# Patient Record
Sex: MALE | Race: WHITE
[De-identification: names, ages, dates, MRNs, and addresses within clinical notes are randomized per-mention and may not be internally consistent; named-entity substitution may affect disease eponyms.]

---

## 2019-03-19 ENCOUNTER — HOSPITAL ENCOUNTER (INPATIENT)
Dept: HOSPITAL 80 - FED | Age: 29
LOS: 3 days | Discharge: HOME | DRG: 885 | End: 2019-03-22
Attending: PSYCHIATRY & NEUROLOGY | Admitting: PSYCHIATRY & NEUROLOGY
Payer: COMMERCIAL

## 2019-03-19 DIAGNOSIS — F33.2: Primary | ICD-10-CM

## 2019-03-19 DIAGNOSIS — R45.851: ICD-10-CM

## 2019-03-19 LAB — PLATELET # BLD: 205 10^3/UL (ref 150–400)

## 2019-03-19 PROCEDURE — G0480 DRUG TEST DEF 1-7 CLASSES: HCPCS

## 2019-03-19 NOTE — EDPHY
H & P


Time Seen by Provider: 03/19/19 23:03


HPI/ROS: 





Chief Complaint:  Suicidal ideation, overdose





HPI:  28-year-old male with a history of depression and feelings of 

hopelessness climbed [place] this afternoon and took 20, 10 mg Vicodin 

tablets that he had taken from his grandfather.  Patient states he took these 

at approximately 4:00 in the afternoon.  This was an attempted suicide.  Denies 

any other ingestions.  He does states that he did count tablets.  EMS was 

called and patient was brought down by [email or web address] rescue.  Patient has been 

placed on a mental health hold by the [facility]'s department.  EMS did give a 

0.25 of Narcan due to the patient's somnolence and he has been awake, alert and 

answering questions.  Denies any other ingestions.  Has a history of 

depression.  He does take citalopram.  Does not have a counselor or 

psychiatrist.  States that he took these because he is "in a lot of pain" and 

does not want to be a burden to his family.





ROS:  10 systems were reviewed and were negative except those elements noted in 

the HPI.





PMH:  Denies





Social History: No smoking, occasional alcohol,  no recreational drug use





Family History: non-contributory





Physical Exam:


Gen: Awake, Alert, blunted affect


HEENT:  


     Nose: no rhinorrhea


     Eyes: PERRLA, EOMI


     Mouth: Moist mucosa 


Neck: Supple, no JVD


Chest: nontender, lungs clear to auscultation


Heart: S1, S2 normal, no murmur


Abd: Soft, non-tender, no guarding


Back: no CVA tenderness, no midline tenderness 


Ext: no edema, non-tender


Skin: no rash


Neuro: CN II-XII intact, Sensation grossly intact, Strength 5/5 in bilateral 

upper and lower extremities


 (Koko Burris)


Constitutional: 


 Initial Vital Signs











Temperature (C)  36.5 C   03/19/19 23:01


 


Heart Rate  82   03/19/19 23:01


 


Respiratory Rate  17   03/19/19 23:01


 


Blood Pressure  155/112 H  03/19/19 23:01


 


O2 Sat (%)  97   03/19/19 23:01








 











O2 Delivery Mode               Room Air














Allergies/Adverse Reactions: 


 





No Known Allergies Allergy (Verified 03/20/19 08:23)


 








Home Medications: 














 Medication  Instructions  Recorded


 


Citalopram [CeleXA] 20 mg PO DAILY 03/19/19


 


Ascorbic Acid [Vitamin C 500 mg 500 mg PO DAILY 03/20/19





(*)]  


 


Glucosamine Sulfate [Glucosamine 500 mg PO DAILY 03/20/19





Sulfate 500 MG (*)]  


 


Multivitamins [Multivitamin (*)] 1 each PO DAILY 03/20/19


 


Omega-3 Fatty Acids [Fish Oil 1000 1,000 mg PO DAILY 03/20/19





mg (*)]  


 


Vitamin B Complex [Vitamin B 1 each PO DAILY 03/20/19





Complex (OTC)]  














Medical Decision Making


ED Course/Re-evaluation: 





28-year-old suicidal male with a Vicodin ingestion 7 hr prior to arrival.  

Patient is very coughing about this time of ingestion.  His 7 hr level is 40.  

The cut off on the Carissa Medrano nomogram at hours is 90.  He is well within 

the safe limits.  





A repeat Tylenol level less than 10. No evidence of toxicity.  Patient is 

medically cleared for mental health evaluation.  





0700  patient signed out to Dr. Rocha pending mental health evaluation.  No 

issues during my care of this patient overnight. (Koko Burris)


Other Provider: 





Care assumed at 0642 ; evaluated by Dr. Burris last night for overdose and per 

him deemed clinically stable for psychiatric evaluation and treatment, no 

further diagnostics or therapeutics.  Plan this morning for mental health 

evaluation, on a mental health hold.





800:  The patient will be transferred to [facility] for 

inpatient psychiatric hospital bed not available at this facility, in stable 

condition; accepting physician is Dr. Mateo Faulkner.  EMTALA form 

completed. (Ran Rocha)





- Data Points


Laboratory Results: 


 Laboratory Results





 03/19/19 23:16 





 03/19/19 23:16 





 











  03/20/19 03/20/19 03/19/19





  04:00 03:10 23:16


 


WBC      





    


 


RBC      





    


 


Hgb      





    


 


Hct      





    


 


MCV      





    


 


MCH      





    


 


MCHC      





    


 


RDW      





    


 


Plt Count      





    


 


MPV      





    


 


Neut % (Auto)      





    


 


Lymph % (Auto)      





    


 


Mono % (Auto)      





    


 


Eos % (Auto)      





    


 


Baso % (Auto)      





    


 


Nucleat RBC Rel Count      





    


 


Absolute Neuts (auto)      





    


 


Absolute Lymphs (auto)      





    


 


Absolute Monos (auto)      





    


 


Absolute Eos (auto)      





    


 


Absolute Basos (auto)      





    


 


Absolute Nucleated RBC      





    


 


Immature Gran %      





    


 


Immature Gran #      





    


 


Sodium      142 mEq/L mEq/L





     (135-145) 


 


Potassium      4.0 mEq/L mEq/L





     (3.5-5.2) 


 


Chloride      105 mEq/L mEq/L





     () 


 


Carbon Dioxide      23 mEq/l mEq/l





     (22-31) 


 


Anion Gap      14 mEq/L mEq/L





     (6-14) 


 


BUN      15 mg/dL mg/dL





     (7-23) 


 


Creatinine      1.2 mg/dL mg/dL





     (0.7-1.3) 


 


Estimated GFR      > 60 





    


 


Glucose      135 mg/dL H mg/dL





     () 


 


Calcium      9.7 mg/dL mg/dL





     (8.5-10.4) 


 


Urine Opiates Screen    NON-NEGATIVE  H   





    (NEGATIVE)  


 


Acetaminophen  < 10 mcg/mL L mcg/mL    40 mcg/mL H mcg/mL





   (10-30)    (10-30) 


 


Urine Barbiturates    NEGATIVE   





    (NEGATIVE)  


 


Ur Phencyclidine Scrn    NEGATIVE   





    (NEGATIVE)  


 


Ur Amphetamine Screen    NEGATIVE   





    (NEGATIVE)  


 


U Benzodiazepines Scrn    NEGATIVE   





    (NEGATIVE)  


 


Urine Cocaine Screen    NEGATIVE   





    (NEGATIVE)  


 


U Marijuana (THC) Screen    NEGATIVE   





    (NEGATIVE)  


 


Ethyl Alcohol      < 10 mg/dL mg/dL





     (0-10) 














  03/19/19





  23:16


 


WBC  12.98 10^3/uL H 10^3/uL





   (3.80-9.50) 


 


RBC  5.24 10^6/uL 10^6/uL





   (4.40-6.38) 


 


Hgb  16.1 g/dL g/dL





   (13.7-17.5) 


 


Hct  48.0 % %





   (40.0-51.0) 


 


MCV  91.6 fL fL





   (81.5-99.8) 


 


MCH  30.7 pg pg





   (27.9-34.1) 


 


MCHC  33.5 g/dL g/dL





   (32.4-36.7) 


 


RDW  12.9 % %





   (11.5-15.2) 


 


Plt Count  205 10^3/uL 10^3/uL





   (150-400) 


 


MPV  10.1 fL fL





   (8.7-11.7) 


 


Neut % (Auto)  82.5 % H %





   (39.3-74.2) 


 


Lymph % (Auto)  10.1 % L %





   (15.0-45.0) 


 


Mono % (Auto)  5.9 % %





   (4.5-13.0) 


 


Eos % (Auto)  0.5 % L %





   (0.6-7.6) 


 


Baso % (Auto)  0.5 % %





   (0.3-1.7) 


 


Nucleat RBC Rel Count  0.0 % %





   (0.0-0.2) 


 


Absolute Neuts (auto)  10.72 10^3/uL H 10^3/uL





   (1.70-6.50) 


 


Absolute Lymphs (auto)  1.31 10^3/uL 10^3/uL





   (1.00-3.00) 


 


Absolute Monos (auto)  0.76 10^3/uL 10^3/uL





   (0.30-0.80) 


 


Absolute Eos (auto)  0.06 10^3/uL 10^3/uL





   (0.03-0.40) 


 


Absolute Basos (auto)  0.06 10^3/uL 10^3/uL





   (0.02-0.10) 


 


Absolute Nucleated RBC  0.00 10^3/uL 10^3/uL





   (0-0.01) 


 


Immature Gran %  0.5 % %





   (0.0-1.1) 


 


Immature Gran #  0.07 10^3/uL 10^3/uL





   (0.00-0.10) 


 


Sodium  





  


 


Potassium  





  


 


Chloride  





  


 


Carbon Dioxide  





  


 


Anion Gap  





  


 


BUN  





  


 


Creatinine  





  


 


Estimated GFR  





  


 


Glucose  





  


 


Calcium  





  


 


Urine Opiates Screen  





  


 


Acetaminophen  





  


 


Urine Barbiturates  





  


 


Ur Phencyclidine Scrn  





  


 


Ur Amphetamine Screen  





  


 


U Benzodiazepines Scrn  





  


 


Urine Cocaine Screen  





  


 


U Marijuana (THC) Screen  





  


 


Ethyl Alcohol  





  














Departure





- Departure


Disposition: [facility] IP


Clinical Impression: 


 Severe major depression, Suicidal ideation





Condition: Fair


Referrals: 


Frankel,Zara, MD [Medical Doctor] - As per Instructions
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
## 2019-03-20 NOTE — ASMTBHMTP
Master Treatment Plan

 

Master Treatment Plan         Answers:  Depressed Mood with                   

for:                                    Suicidal Ideation                     

Date:                         03/20/2019

Diagnosis on Admission:       Major Depressive Disorder, recurrent, severe 296.33 

                               (F33.2)

Expected length of stay:      3-5 days

Reason for admission:         

Notes:

Per Report: 



Pt is a 29 yo, single, employed,  male with reported past history of depression and social 


anxiety which began during his mid teen years, brought to [facility] ED by BPD on M1 hold which 

noted: Michelle sent a suicidal text message to his family. His phone was pinged and his location 

was discovered. He was contacted by a hiker. Michelle took 20, 10 mg Vicodin pills. Michelle was 

trying to end his life. [place] [employer] [place] 

officers. BAL level was zero upon arrival. UDS results were positive for opiates and Acetaminophen 

level at 2316 hrs was 40 then down below 10 at 0400 hrs. Pt appeared clean yet unkempt. He was 

polite and cooperative throughout the interview and provided meaningful responses to direct 

questions. He appeared to be a reliable historian. His mood is depressed and affect was flat. He 

endorsed anhedonia, self-depricating statements, feeling of worthlessness and hopelessness. He 

reported he was not anticipating surviving his suicide attempt. 

Patient's stated              

presenting problems:          

Notes:

"[I] tried to complete suicide yesterday."

Patient's goals for           

treatment:                    

Notes:

"to find a way to maintain a normal mood."

Patient's strengths:          

Notes:

none

Identify supports outside     

of hospital:                  

Notes:

family in [place]

Discharge criteria:           

Notes:

Suicidal Ideation will resolve and patient will have a plan to safely manage recurrent suicidal 

ideations. 

Initial disposition           

plan/considerations:          

Notes:

"go to my grandparents house in [place]" 

Master Treatment Plan Required Signatures

 

Psychiatrist signature:       Answers:  Psychiatrist: ______________________________

RN on-shift signature:        Answers:  RN: ____________________________________

Patient signature:            Answers:  Patient: ____________________________________

 

Date Signed:  03/20/2019 03:58 PM

Electronically Signed By:Karlo Draper

## 2019-03-20 NOTE — ASMTTCLDSP
TLC Discharge Disposition

 

Disposition:                  Answers:  Admit                                 

Disposition Notes:            

Notes:

Admit 3N.

Discharge                     

Concerns/Recommendations:     

Notes:

In consultation with [facility] ED physician, Ran Rocha MD and on-call psychiatrist, Mateo Faulkner MD, both concurred that pt appears to meet 27-65 criteria requiring psychiatric 

hospitalization as pt appears to be an imminent risk of harm to self due to a mental illness 

condition. Pt was read the Patient Rights and Responsibilities Statement on 3/20/19 at 0800 

hrs, original placed on chart, and was given photocopy of Rights. Pt signed the Patient Rights. Pt 

was given the 3N prohibited belongings list while in the ED.

Was patient given the         Answers:  Yes                                   

Inpatient Behavioral                                                          

Health Prohibited                                                             

Belongings List while in                                                      

the ED?                                                                       

For inpatient                 Mateo Faulkner MD

admission, the following      

psychiatrist agreed to        

accept patient for            

admission to Behavioral       

Health ([facility]):              

Type of Hold:                 Answers:  M1/72-hour Hold                       

Hold initiated by:            Answers:  Police                                

 

Date Signed:  03/20/2019 11:01 AM

Electronically Signed By:Ivan Phillips

## 2019-03-20 NOTE — PDMN
Medical Necessity


Medical necessity: Pt meets inpt criteria per MD order and MCG [id number], Major 

Depressive Disorder, Adult: Inpatient Care, 3 days. 29 y/o w/hx depression 

admitted on M1 hold due to suicide attempt, major depressive disorder, recurrent

, severe, without psychosis and possible bipolar type 2 disorder w/hx cycling 

mood, alcohol and cannabis use disorders, requires inpt psychiatric 

hospitalization at this time as pt appears to be an imminent risk of harm to 

self due to mental illness condition.
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
## 2019-03-21 NOTE — ASMTBHFAM
Notes

 

Note:                         

Notes:

CC spoke with pt's sister, Evie



SOC stated she is "really concerned" about her brother. SOC stated pt. made statements about how 

good he felt [place]. SOC is worried pt with want that 

feeling again. SOC stated pt. will seem fine and happy and then "drop" into a depression and will 

be back to normal in "3-4 days". SOC stated pt's biggest concern is his job, as he is not able to 

hold a job. SOC stated pt. has "bad anxiety with working". SOC stated pt. lives with his 

grandparents, adding the pt. is embarrassed about this. SOC stated she is "debating" on having the 

pt. come live with her in [place], but has not spoken with the pt. about this. 



 

 

Date Signed:  03/21/2019 03:30 PM

Electronically Signed By:Anita Fernandez

## 2019-03-21 NOTE — ASMTCMCOM
CM Note

 

CM Note                       

Notes:

Pt. reports "doing a lot better after talking to Dr. Faulkner". Pt. shared about suffering from 

depression, and being "tired of going back forth". Pt. reports "feeling very positive about 

medications".  Pt. reports speaking with his family recently. Pt. stated his family is "extremely 

supportive" adding he is "very grateful" for them. PT. reports he "need to be open and talking 

about how I'm feeling". Pt. stated today he "see a lof of hope". Pt. reports no issues with his 

current medications, adding he is "still constipated". Pt. reports sleeping "really 

good". Pt. reports getting the "perfect amount" of food at meals. Pt. reports attending two groups 

yesterday. Pe. denied SI, HI, AVH and paranoia. Pt. stated, if possible, to schedule his 

appointment after 10am any day of the week. 



Pt. presents as alert, calm, good eye contact,  positive affect, polite, and cooperative. Staff 

report pt. sleeping 8 hours and being medication compliant 



CC obtained ROIs for [facility] and [facility]. CC to reach out for follow up 

appointments. Per provider, pt. will potentially discharge tomorrow 3/22/19

 

Date Signed:  03/21/2019 02:07 PM

Electronically Signed By:Anita Fernandez

## 2019-03-21 NOTE — SOAPPROG
SOAP Progress Note


Assessment/Plan: 


Assessment:


























Plan:





03/21/19 16:39


Mood:  Much improved.  CCM.


Subjective: 





Pt seen, discussed with staff, interviewed in Treatment Team meeting.  He 

reports feeling "a lot better" today. States he feels "really supported" by 

family, but still feels shame and guilt over being a burden to them.  He is 

active in groups and states he is benefitting from therapies.  He reports his 

suicidality as "zero" today.  Tolerating meds well with no side effects after 

two doses.


Objective: 





 Vital Signs











Temp Pulse Resp BP Pulse Ox


 


 36.7 C   79   16   144/86 H  95 


 


 03/21/19 06:00  03/21/19 06:00  03/21/19 06:00  03/21/19 06:00  03/21/19 06:00








MSE:  Calm, coop.  Activity and speech are nl.  Affect is euthymic, stable, 

approp.  Mood is "good."  TP is linear.  TC reveals no psychosis. Denies 

current SI.





- Time Spent With Patient


Time Spent With Patient: 





25"





ICD10 Worksheet


Patient Problems: 


 Problems











Problem Status Onset


 


Severe major depression Acute  


 


Suicidal ideation Acute

## 2019-03-22 VITALS — SYSTOLIC BLOOD PRESSURE: 128 MMHG | DIASTOLIC BLOOD PRESSURE: 81 MMHG

## 2019-03-22 NOTE — ASMTBHFAM
Notes

 

Note:                         

Notes:

CC participated in a family meeting to discuss discharge plans. Present in the meeting were 

Dr. Faulkner, ct., [facility] grandparents, uncle and sister (by phone). Family members appear very 

supportive of ct. Dr. Faulkner discussed that the goal of  treatment is to stabilized mood and that 

ct. is being medicated accordingly. Family discussed whys to communicate and support 

ct. Ct. reported that he is planning on seeking services at [facility]. He reported that he 

feels better and is more hopeful. Ct. is being discharge to the care of his family. 

 

Date Signed:  03/22/2019 02:23 PM

Electronically Signed By:Karina Sewell